# Patient Record
Sex: MALE | Race: OTHER | Employment: STUDENT | URBAN - METROPOLITAN AREA
[De-identification: names, ages, dates, MRNs, and addresses within clinical notes are randomized per-mention and may not be internally consistent; named-entity substitution may affect disease eponyms.]

---

## 2017-11-05 ENCOUNTER — HOSPITAL ENCOUNTER (EMERGENCY)
Age: 22
Discharge: HOME | End: 2017-11-05
Attending: EMERGENCY MEDICINE
Payer: COMMERCIAL

## 2017-11-05 ENCOUNTER — APPOINTMENT (OUTPATIENT)
Dept: GENERAL RADIOLOGY | Age: 22
End: 2017-11-05
Attending: EMERGENCY MEDICINE
Payer: COMMERCIAL

## 2017-11-05 VITALS
WEIGHT: 240 LBS | TEMPERATURE: 99 F | OXYGEN SATURATION: 96 % | RESPIRATION RATE: 18 BRPM | SYSTOLIC BLOOD PRESSURE: 153 MMHG | HEIGHT: 67 IN | DIASTOLIC BLOOD PRESSURE: 87 MMHG | HEART RATE: 83 BPM | BODY MASS INDEX: 37.67 KG/M2

## 2017-11-05 DIAGNOSIS — R07.9 ACUTE CHEST PAIN: Primary | ICD-10-CM

## 2017-11-05 PROCEDURE — 93010 ELECTROCARDIOGRAM REPORT: CPT

## 2017-11-05 PROCEDURE — 99285 EMERGENCY DEPT VISIT HI MDM: CPT

## 2017-11-05 PROCEDURE — 85025 COMPLETE CBC W/AUTO DIFF WBC: CPT | Performed by: EMERGENCY MEDICINE

## 2017-11-05 PROCEDURE — 84484 ASSAY OF TROPONIN QUANT: CPT | Performed by: EMERGENCY MEDICINE

## 2017-11-05 PROCEDURE — 93005 ELECTROCARDIOGRAM TRACING: CPT

## 2017-11-05 PROCEDURE — 71020 XR CHEST PA + LAT CHEST (CPT=71020): CPT | Performed by: EMERGENCY MEDICINE

## 2017-11-05 PROCEDURE — 85378 FIBRIN DEGRADE SEMIQUANT: CPT | Performed by: EMERGENCY MEDICINE

## 2017-11-05 PROCEDURE — 80048 BASIC METABOLIC PNL TOTAL CA: CPT | Performed by: EMERGENCY MEDICINE

## 2017-11-05 PROCEDURE — 36415 COLL VENOUS BLD VENIPUNCTURE: CPT

## 2017-11-05 RX ORDER — KETOROLAC TROMETHAMINE 30 MG/ML
30 INJECTION, SOLUTION INTRAMUSCULAR; INTRAVENOUS ONCE
Status: COMPLETED | OUTPATIENT
Start: 2017-11-05 | End: 2017-11-05

## 2017-11-05 NOTE — ED INITIAL ASSESSMENT (HPI)
Pt to ed co chest pain that has been on going for past 2 weeks tonoc feels different pt reports a \"pinch\" type feeling whenever he takes a deep breath denies nausea/denies recent travel reports cough for about a day that's non productive

## 2017-11-05 NOTE — ED PROVIDER NOTES
Patient Seen in: [facility] In [place]    History   Patient presents with:  Chest Pain Angina (cardiovascular)    Stated Complaint: CHEST PAIN FOR 2 WEEKS    HPI    This is a 28-year-old male who presents with sharp left sided chest p rales, no retractions, and no wheezing. HEART:  Regular rate and rhythm. S1 and S2. No murmurs, no rubs or gallops. Chest: Pain is not reproducible on exam  ABDOMEN: Soft, nontender and nondistended. Normoactive bowel sounds. No rebound. No guarding. Bicarb 27. Troponin and d-dimer negative. EKG showed a sinus rhythm no acute changes. Chest x-ray negative. No acute findings during ED stay. Some relief with Toradol. Suspect muscle skeletal pleuritic etiology. Return if worse.   Follow-up with too

## (undated) NOTE — ED AVS SNAPSHOT
Arsh Henry   MRN: [id number]    Department:  [facility] in [place]   Date of Visit:  11/5/2017           Disclosure     Insurance plans vary and the physician(s) referred by the ER may not be covered by your plan.  Please contact you If you have been prescribed any medication(s), please fill your prescription right away and begin taking the medication(s) as directed    If the emergency physician has read X-rays, these will be re-interpreted by a radiologist.  If there is a significant